# Patient Record
(demographics unavailable — no encounter records)

---

## 2020-04-30 NOTE — EDM.PDOC
ED HPI GENERAL MEDICAL PROBLEM





- General


Chief Complaint: General


Stated Complaint: MEDICAL VIA NORTH


Time Seen by Provider: 04/30/20 12:39


Source of Information: Reports: Patient, EMS, Nursing Home Records


History Limitations: Reports: No Limitations





- History of Present Illness


INITIAL COMMENTS - FREE TEXT/NARRATIVE: 





pt arrived as a transfer from [facility] and was felt to be hospice pt. She has 

become more alert and now her  is wanting everyting done.  She is being 

somewhat verbal. She was on dialysis and her creatnine is good and her gfr is 

greater than 60. 


Onset: Gradual, Other (pt is not having acute problems at this time. )


Duration: Day(s):


Location: Reports: Other ( the nursing home is concerned that she is not able 

to have all of the specialities involved that needs to be. )


Associated Symptoms: Reports: Other (mild dehydration)





- Related Data


 Allergies











Allergy/AdvReac Type Severity Reaction Status Date / Time


 


No Known Allergies Allergy   Verified 04/30/20 12:10











Home Meds: 


 Home Meds





Amiodarone [Cordarone] 200 mg PO DAILY 04/30/20 [History]


Citalopram [Citalopram HBr] 20 mg PO DAILY 04/30/20 [History]


Enoxaparin [Lovenox] 30 mg SQ BID 04/30/20 [History]


Furosemide 10 mg PO DAILY 04/30/20 [History]


Isosorbide Dinitrate 5 mg PO TID 04/30/20 [History]


Lansoprazole 30 mg PO DAILY 04/30/20 [History]


Metoprolol Tartrate 6.25 mg PO BID 04/30/20 [History]


Sodium Bicarbonate 650 mg PO TID 04/30/20 [History]


Sulfamethoxazole/Trimethoprim [Septra Susp 200-40 MG/5 ML] 10 ml PO DAILY 04/30/ 20 [History]


levETIRAcetam [Levetiracetam] 7.5 ml PO BID 04/30/20 [History]


mycophenolate mofetiL [Mycophenolate Mofetil] 2.5 ml PO BID 04/30/20 [History]


predniSONE [Prednisone] 10 mg PO DAILY 04/30/20 [History]











Past Medical History


Cardiovascular History: Reports: Blood Clots/VTE/DVT, Hypertension, Other (See 

Below)


Other Cardiovascular History: endocarditis


Respiratory History: Reports: Other (See Below)


Other Respiratory History: recently intubated at [facility]


Genitourinary History: Reports: None


Hematologic History: Reports: Other (See Below)


Other Hematologic History: states autoimmune disorder


Immunologic History: Reports: Other (See Below)


Other Immunologic History: states autoimmune disorder





- Past Surgical History


Cardiovascular Surgical History: Reports: None


Respiratory Surgical History: Reports: None


GI Surgical History: Reports: Appendectomy, Cholecystectomy, Other (See Below)


Other GI Surgeries/Procedures: gtube in place


Female  Surgical History: Reports: None





Social & Family History





- Tobacco Use


Smoking Status *Q: Never Smoker





- Recreational Drug Use


Recreational Drug Use: No





ED ROS GENERAL





- Review of Systems


Review Of Systems: See Below


Constitutional: Reports: Weakness, Other (pt is on a feeding tube. )


HEENT: Reports: No Symptoms


Respiratory: Reports: Other (pt has good oxgenation and is not labored. )


Cardiovascular: Reports: No Symptoms


Endocrine: Reports: No Symptoms


GI/Abdominal: Reports: No Symptoms


: Reports: No Symptoms


Musculoskeletal: Reports: No Symptoms


Skin: Reports: No Symptoms





ED EXAM, GENERAL





- Physical Exam


Exam: See Below


Free Text/Narrative:: 





pt is alert and is able to answer a number of questions. She is not able to eat 

and is on a feeding tube only. She was in the hosp from Mar 5th until about 5-6 

days ago. She  Had infection on her valve leaflets . She has necrotic toes.  

She is in no resp distress at this point. 


Exam Limited By: No Limitations


General Appearance: Alert, No Apparent Distress, Other (pt does not have a 

fever and she has good oxgenation)


Nose: Normal Inspection


Throat/Mouth: Normal Inspection


Head: Atraumatic


Neck: Normal Inspection


Respiratory/Chest: No Respiratory Distress


Cardiovascular: Regular Rate, Rhythm


GI/Abdominal: Soft, Non-Tender


 (Female) Exam: Deferred


Rectal (Female) Exam: Deferred


Back Exam: Normal Inspection


Extremities: Other (pt has necrotic toes)


Neurological: Alert, Oriented, Normal Cognition





Course





- Vital Signs


Last Recorded V/S: 


 Last Vital Signs











Temp  36.3 C   04/30/20 12:22


 


Pulse  68   04/30/20 12:45


 


Resp  16   04/30/20 16:05


 


BP  141/76 H  04/30/20 16:05


 


Pulse Ox  100   04/30/20 16:05














- Orders/Labs/Meds


Orders: 


 Active Orders 24 hr











 Category Date Time Status


 


 CULTURE URINE [RM] Stat Lab  04/30/20 15:14 Received


 


 Sodium Chloride 0.9% [Normal Saline] 1,000 ml Med  04/30/20 13:45 Active





 IV ASDIRECTED   








 Medication Orders





Sodium Chloride (Normal Saline)  1,000 mls @ 250 mls/hr IV ASDIRECTED ANNELIESE


   Last Admin: 04/30/20 14:05  Dose: 250 mls/hr








Labs: 


 Laboratory Tests











  04/30/20 04/30/20 04/30/20 Range/Units





  13:00 13:00 14:46 


 


WBC  6.0    (4.5-11.0)  K/uL


 


RBC  3.52    (3.30-5.50)  M/uL


 


Hgb  9.4 L    (12.0-15.0)  g/dL


 


Hct  31.2 L    (36.0-48.0)  %


 


MCV  89    (80-98)  fL


 


MCH  27    (27-31)  pg


 


MCHC  30 L    (32-36)  %


 


Plt Count  249    (150-400)  K/uL


 


Neut % (Auto)  87 H    (36-66)  %


 


Lymph % (Auto)  9 L    (24-44)  %


 


Mono % (Auto)  4    (2-6)  %


 


Eos % (Auto)  1 L    (2-4)  %


 


Baso % (Auto)  0    (0-1)  %


 


Sodium   135 L   (140-148)  mmol/L


 


Potassium   4.9   (3.6-5.2)  mmol/L


 


Chloride   101   (100-108)  mmol/L


 


Carbon Dioxide   26   (21-32)  mmol/L


 


Anion Gap   12.9   (5.0-14.0)  mmol/L


 


BUN   27 H   (7-18)  mg/dL


 


Creatinine   0.9   (0.6-1.0)  mg/dL


 


Est Cr Clr Drug Dosing   73.45   mL/min


 


Estimated GFR (MDRD)   > 60   (>60)  


 


Glucose   92   ()  mg/dL


 


Calcium   8.9   (8.5-10.1)  mg/dL


 


Total Bilirubin   0.3   (0.2-1.0)  mg/dL


 


AST   49 H   (15-37)  U/L


 


ALT   100 H   (12-78)  U/L


 


Alkaline Phosphatase   105   ()  U/L


 


Total Protein   5.8 L   (6.4-8.2)  g/dL


 


Albumin   2.5 L   (3.4-5.0)  g/dL


 


Globulin   3.3   (2.3-3.5)  g/dL


 


Albumin/Globulin Ratio   0.8 L   (1.2-2.2)  


 


Urine Color    Yellow  (YELLOW)  


 


Urine Appearance    Clear  (CLEAR)  


 


Urine pH    7.5  (5.0-8.0)  


 


Ur Specific Gravity    1.020  (1.008-1.030)  


 


Urine Protein    Negative  (NEGATIVE)  mg/dL


 


Urine Glucose (UA)    Negative  (NEGATIVE)  mg/dL


 


Urine Ketones    Negative  (NEGATIVE)  mg/dL


 


Urine Occult Blood    Trace-intact H  (NEGATIVE)  


 


Urine Nitrite    Negative  (NEGATIVE)  


 


Urine Bilirubin    Negative  (NEGATIVE)  


 


Urine Urobilinogen    0.2  (0.2-1.0)  EU/dL


 


Ur Leukocyte Esterase    Small H  (NEGATIVE)  


 


Urine RBC    0-5  (0-5)  


 


Urine WBC    Packed H  (0-5)  


 


Ur Epithelial Cells    Rare  


 


Amorphous Sediment    Moderate  


 


Urine Bacteria    Few  


 


Urine Mucus    Many  











Meds: 


Medications











Generic Name Dose Route Start Last Admin





  Trade Name Freq  PRN Reason Stop Dose Admin


 


Sodium Chloride  1,000 mls @ 250 mls/hr  04/30/20 13:45  04/30/20 14:05





  Normal Saline  IV   250 mls/hr





  ASDIRECTED ANNELIESE   Administration





     





     





     





     














Discontinued Medications














Generic Name Dose Route Start Last Admin





  Trade Name Freq  PRN Reason Stop Dose Admin


 


Levofloxacin/Dextrose 500 mg/  100 mls @ 100 mls/hr  04/30/20 15:16  04/30/20 15

:26





  Premix  IV  04/30/20 16:15  100 mls/hr





  ONETIME ONE   Administration





     





     





     





     














- Re-Assessments/Exams


Free Text/Narrative Re-Assessment/Exam: 





04/30/20 15:48


pt has good renal funtion. She has a area on her chest xray which I believe is 

from her previous pneumonia. She has a normal wbc and  no fever. 


04/30/20 16:45


 Her urine was found to be infected. She has been on septra so will swith 

antibiotic and fluids need to be pushed. Dr miller should see her for wound 

care. 





Departure





- Departure


Time of Disposition: 16:46


Disposition: Home, Self-Care 01


Condition: Fair


Clinical Impression: 


 UTI (urinary tract infection), Dehydration








- Discharge Information


Referrals: 


PCP,None [Primary Care Provider] - 


Forms:  ED Department Discharge


Care Plan Goals: 


 resume all previous orders except  the bactrim switch to cipro 500mg bid per 

feeding tube ..





Sepsis Event Note





- Evaluation


Sepsis Screening Result: No Definite Risk





- Focused Exam


Vital Signs: 


 Vital Signs











  Temp Pulse Resp BP Pulse Ox


 


 04/30/20 16:05    16  141/76 H  100


 


 04/30/20 15:18    16  140/74  98


 


 04/30/20 14:11    18  134/72  98


 


 04/30/20 13:30    18  146/74 H  95


 


 04/30/20 13:00    18  131/70  96


 


 04/30/20 12:45   68  20  128/70  96


 


 04/30/20 12:22  36.3 C  65  18  132/77  95











Date Exam was Performed: 04/30/20


Time Exam was Performed: 16:45





- My Orders


Last 24 Hours: 


My Active Orders





04/30/20 13:45


Sodium Chloride 0.9% [Normal Saline] 1,000 ml IV ASDIRECTED 





04/30/20 15:14


CULTURE URINE [RM] Stat 














- Assessment/Plan


Last 24 Hours: 


My Active Orders





04/30/20 13:45


Sodium Chloride 0.9% [Normal Saline] 1,000 ml IV ASDIRECTED 





04/30/20 15:14


CULTURE URINE [RM] Stat

## 2020-04-30 NOTE — CR
CHEST: Portable 4/30/2020 at 1:50 PM

 

CLINICAL HISTORY:SOB

 

COMPARISON:None

 

FINDINGS:  Heart size is upper limits of normal. Pulmonary vascularity is

normal. There is a left lower lobe pneumonic infiltrate and small left effusion.

 

Impression: Borderline cardiomegaly

 

Left lower lobe pneumonia with small left effusion.